# Patient Record
Sex: FEMALE | Race: WHITE
[De-identification: names, ages, dates, MRNs, and addresses within clinical notes are randomized per-mention and may not be internally consistent; named-entity substitution may affect disease eponyms.]

---

## 2018-04-27 ENCOUNTER — HOSPITAL ENCOUNTER (EMERGENCY)
Dept: HOSPITAL 14 - H.ER | Age: 58
LOS: 1 days | Discharge: HOME | End: 2018-04-28
Payer: MEDICARE

## 2018-04-27 VITALS — BODY MASS INDEX: 26.6 KG/M2

## 2018-04-27 DIAGNOSIS — I25.10: ICD-10-CM

## 2018-04-27 DIAGNOSIS — Z79.4: ICD-10-CM

## 2018-04-27 DIAGNOSIS — J44.9: ICD-10-CM

## 2018-04-27 DIAGNOSIS — I10: ICD-10-CM

## 2018-04-27 DIAGNOSIS — E11.9: ICD-10-CM

## 2018-04-27 DIAGNOSIS — M79.7: ICD-10-CM

## 2018-04-27 DIAGNOSIS — E03.9: ICD-10-CM

## 2018-04-27 DIAGNOSIS — K21.0: Primary | ICD-10-CM

## 2018-04-27 DIAGNOSIS — Z90.49: ICD-10-CM

## 2018-04-27 DIAGNOSIS — E78.00: ICD-10-CM

## 2018-04-27 LAB
ALBUMIN SERPL-MCNC: 4 G/DL (ref 3.5–5)
ALBUMIN/GLOB SERPL: 0.9 {RATIO} (ref 1–2.1)
ALT SERPL-CCNC: 52 U/L (ref 9–52)
APTT BLD: 31 SECONDS (ref 25.6–37.1)
AST SERPL-CCNC: 32 U/L (ref 14–36)
BASOPHILS # BLD AUTO: 0 K/UL (ref 0–0.2)
BASOPHILS NFR BLD: 0.3 % (ref 0–2)
BNP SERPL-MCNC: 32.7 PG/ML (ref 0–900)
BUN SERPL-MCNC: 14 MG/DL (ref 7–17)
CALCIUM SERPL-MCNC: 10 MG/DL (ref 8.4–10.2)
D DIMER: 449 NG/MLDDU (ref 0–230)
EOSINOPHIL # BLD AUTO: 0.2 K/UL (ref 0–0.7)
EOSINOPHIL NFR BLD: 2.1 % (ref 0–4)
ERYTHROCYTE [DISTWIDTH] IN BLOOD BY AUTOMATED COUNT: 15.2 % (ref 11.5–14.5)
GFR NON-AFRICAN AMERICAN: > 60
HGB BLD-MCNC: 14.4 G/DL (ref 12–16)
INR PPP: 1 (ref 0.9–1.2)
LYMPHOCYTES # BLD AUTO: 2.3 K/UL (ref 1–4.3)
LYMPHOCYTES NFR BLD AUTO: 20 % (ref 20–40)
MCH RBC QN AUTO: 28.2 PG (ref 27–31)
MCHC RBC AUTO-ENTMCNC: 33.5 G/DL (ref 33–37)
MCV RBC AUTO: 84.2 FL (ref 81–99)
MONOCYTES # BLD: 0.8 K/UL (ref 0–0.8)
MONOCYTES NFR BLD: 7.2 % (ref 0–10)
NEUTROPHILS # BLD: 8.2 K/UL (ref 1.8–7)
NEUTROPHILS NFR BLD AUTO: 70.4 % (ref 50–75)
NRBC BLD AUTO-RTO: 0.1 % (ref 0–0)
PLATELET # BLD: 343 K/UL (ref 130–400)
PMV BLD AUTO: 8.3 FL (ref 7.2–11.7)
PROTHROMBIN TIME: 11.4 SECONDS (ref 9.8–13.1)
RBC # BLD AUTO: 5.09 MIL/UL (ref 3.8–5.2)
WBC # BLD AUTO: 11.6 K/UL (ref 4.8–10.8)

## 2018-04-27 PROCEDURE — 94640 AIRWAY INHALATION TREATMENT: CPT

## 2018-04-27 PROCEDURE — 85378 FIBRIN DEGRADE SEMIQUANT: CPT

## 2018-04-27 PROCEDURE — 87040 BLOOD CULTURE FOR BACTERIA: CPT

## 2018-04-27 PROCEDURE — 84484 ASSAY OF TROPONIN QUANT: CPT

## 2018-04-27 PROCEDURE — 99285 EMERGENCY DEPT VISIT HI MDM: CPT

## 2018-04-27 PROCEDURE — 71046 X-RAY EXAM CHEST 2 VIEWS: CPT

## 2018-04-27 PROCEDURE — 87804 INFLUENZA ASSAY W/OPTIC: CPT

## 2018-04-27 PROCEDURE — 93005 ELECTROCARDIOGRAM TRACING: CPT

## 2018-04-27 PROCEDURE — 83735 ASSAY OF MAGNESIUM: CPT

## 2018-04-27 PROCEDURE — 83880 ASSAY OF NATRIURETIC PEPTIDE: CPT

## 2018-04-27 PROCEDURE — 85730 THROMBOPLASTIN TIME PARTIAL: CPT

## 2018-04-27 PROCEDURE — 71275 CT ANGIOGRAPHY CHEST: CPT

## 2018-04-27 PROCEDURE — 84100 ASSAY OF PHOSPHORUS: CPT

## 2018-04-27 PROCEDURE — 80053 COMPREHEN METABOLIC PANEL: CPT

## 2018-04-27 PROCEDURE — 85610 PROTHROMBIN TIME: CPT

## 2018-04-27 PROCEDURE — 96374 THER/PROPH/DIAG INJ IV PUSH: CPT

## 2018-04-27 PROCEDURE — 85025 COMPLETE CBC W/AUTO DIFF WBC: CPT

## 2018-04-27 NOTE — ED PDOC
HPI: Chest Pain


Time Seen by Provider: 04/27/18 21:16


Chief Complaint (Nursing): Chest Pain


Chief Complaint (Provider): Chest Pain


History Per: Patient


History/Exam Limitations: no limitations


Onset/Duration Of Symptoms: Days (x1)


Current Symptoms Are (Timing): Still Present


Additional Complaint(s): 


57 year old female presents to the emergency department with a complaint of 

constantly burning chest pain, midsternal to lower chest with bilateral spasms 

that waxes and wanes, exacerbated with eating and drinking since waking up this 

morning. She also reports a nonproductive cough and wheezing ongoing for 1 

week. Patient has some right-sided neck and head pain but notes those are 

chronic conditions. She denies any fever, chills, abdominal pain, vomiting or 

recent antibiotic use. She states that she uses a steroid inhaler daily but no 

recent oral steroids.





PMD: Derrick Mcdaniels MD





Past Medical History


Reviewed: Historical Data, Nursing Documentation, Vital Signs


Vital Signs: 


 Last Vital Signs











Temp  97.7 F   04/27/18 21:06


 


Pulse  88   04/27/18 21:40


 


Resp  16   04/27/18 21:06


 


BP  142/83   04/27/18 21:06


 


Pulse Ox  96   04/27/18 23:00














- Medical History


PMH: Arthritis, CAD, Diabetes (insulin), Fibromyalgia, HTN, Hypercholesterolemia


   Denies: HIV, Chronic Kidney Disease


   Comment Only: Hyperthyroidism (pending, was tested for), Hypothyroidism (

pending, was tested for)





- Surgical History


Surgical History: Cholecystectomy, Tonsillectomy





- Family History


Family History: States: Unknown Family Hx





- Home Medications


Home Medications: 


 Ambulatory Orders











 Medication  Instructions  Recorded


 


Bimatoprost [Lumigan] 1 drop EACHEYE HS 08/06/14


 


Enalapril Maleate [Enalapril] 10 mg PO DAILY 08/06/14


 


Liraglutide [Victoza] 1.2 mg SC DAILY 08/06/14


 


Tramadol Hydrochloride [Tramadol 50 mg PO BID PRN 08/06/14





HCl]  


 


Zolpidem Tartrate 10 mg PO HS PRN 08/06/14


 


Atorvastatin [Lipitor] 20 mg PO HS 01/21/16


 


Gabapentin [Neurontin] 300 mg PO QID 01/21/16


 


levETIRAcetam [Keppra] 500 mg PO BID 01/21/16


 


Canagliflozin [Invokana] 300 mg PO DAILY #0 tablet 01/22/16


 


Insulin Aspart/Insulin Aspar 30 units SC TID #0 ml 01/22/16





[Novolog Mix 70/30 (70/30  





units/ml) 10 ml]  


 


Insulin Glargine,Hum.rec.anlog 30 unit SC HS #0 01/22/16





[Toujeo Solostar]  


 


MetFORMIN [glucoPHAGE] 1,000 mg PO BID #0 tab 01/22/16


 


Zolpidem [Ambien] 5 mg PO HS PRN #0 tab 01/22/16


 


Omeprazole Magnesium [Prilosec Otc] 20 mg PO DAILY #30 tcp 04/28/18














- Allergies


Allergies/Adverse Reactions: 


 Allergies











Allergy/AdvReac Type Severity Reaction Status Date / Time


 


No Known Allergies Allergy   Verified 04/27/18 21:06














Review of Systems


ROS Statement: Except As Marked, All Systems Reviewed And Found Negative


Constitutional: Negative for: Fever, Chills


Cardiovascular: Positive for: Chest Pain (burning on midsternal to lower chest 

with bilateral spasm)


Respiratory: Positive for: Cough, Wheezing


Gastrointestinal: Negative for: Vomiting, Abdominal Pain


Musculoskeletal: Positive for: Neck Pain (right-sided -chronic)


Neurological: Positive for: Headache (right-sided -chronic)





Physical Exam





- Reviewed


Nursing Documentation Reviewed: Yes


Vital Signs Reviewed: Yes





- Physical Exam


Appears: Positive for: Non-toxic, In Acute Distress (mildly painful)


Head Exam: Positive for: ATRAUMATIC, NORMOCEPHALIC


Skin: Positive for: Warm, Dry


Eye Exam: Positive for: EOMI, PERRL


ENT: Positive for: Pharynx Is (clear), Other (mucus membranes moist)


Neck: Positive for: Painless ROM, Supple


Cardiovascular/Chest: Positive for: Regular Rate, Rhythm.  Negative for: Murmur


Respiratory: Positive for: Wheezing (diffuse on inspiration and expiration 

bilaterally).  Negative for: Accessory Muscle Use, Respiratory Distress


Gastrointestinal/Abdominal: Positive for: Soft.  Negative for: Tenderness


Back: Positive for: Normal Inspection.  Negative for: Muscle Spasm


Extremity: Positive for: Normal ROM.  Negative for: Deformity


Lymphatic: Negative for: Adenopathy


Neurologic/Psych: Positive for: Alert.  Negative for: Motor/Sensory Deficits





- Laboratory Results


Result Diagrams: 


 04/27/18 22:10





 04/27/18 22:10





- ECG


ECG: Positive for: Interpreted By Me


ECG Rhythm: Positive for: Normal QRS, Normal ST Segment, Sinus Rhythm


O2 Sat by Pulse Oximetry: 96 (RA)


Pulse Ox Interpretation: Normal





Medical Decision Making


Medical Decision Making: 


Initial Impression: Chest pain; Asthma exacerbation





Differential Diagnosis: Reflux; Esophagitis; Pneumonia; Bronchitis





Initial Plan:


* EKG


* BNP


* CMP


* Magnesium 


* Phosphorous


* Troponin I


* Urine dipstick


* CBC


* D dimer


* PTT


* PT


* CXR


* Duoneb 6ml INH


* Lidocaine 2% Viscous 10ml PO


* Maalox plus 30ml PO


* Pepcid 40mg PO


* Solu-medrol 125mg IVP


* Blood culture


* Influenza A B


 Ddimer elevated. Otherwise no clinically significant lab abnormalities





 EXAM:


CT Angiography Chest With Intravenous Contrast


CLINICAL HISTORY:


57 years old, female; Pain; Chest pain; Type not specified; Additional info: 

Chest pain elevated


ddimer


TECHNIQUE:


Axial computed tomographic angiography images of the chest with intravenous 

contrast using


pulmonary embolism protocol. All CT scans at this facility use one or more dose 

reduction


techniques, viz.: automated exposure control; ma/kV adjustment per patient size 

(including targeted


exams where dose is matched to indication; i.e. head); or iterative 

reconstruction technique.


3D and MIP reconstructed images were created and reviewed.


Coronal and sagittal reformatted images were created and reviewed.


CONTRAST:


95 mL of visipaque 320 administered intravenously.


COMPARISON:


CR - CHEST TWO VIEWS (PA/LAT) 2016-01-21 10:26


FINDINGS:


Pulmonary arteries: Unremarkable. No pulmonary embolism.


Aorta: No acute findings. No thoracic aortic aneurysm.


Lungs: Unremarkable. No mass. No consolidation.


Pleural space: Unremarkable. No significant effusion. No pneumothorax.


Heart: Unremarkable. No cardiomegaly. No significant pericardial effusion. No 

evidence of RV


dysfunction.


Thyroid: The right thyroid lobe is enlarged.


Bones/joints: No acute fracture. No dislocation.


Soft tissues: Unremarkable.


Lymph nodes: Unremarkable. No enlarged lymph nodes.


Gallbladder and bile ducts: There has been a cholecystectomy.


IMPRESSION:


No acute intrathoracic abnormality. No pulmonary embolism.


Cholecystectomy


Thank you for allowing us to participate in the care of your patient.


Dictated and Authenticated by: Grisel Cooper MD


04/28/2018 12:58 AM Eastern Time ([place] & [place])


 


1am Pt feels better. DAMARIS pt findings and plan of care.


Stable for dc with PMD/GI follow up.





--------------------------------------------------------------------------------

----------------------------------------


Scribe Attestation:


Documented by Sandra Dey, acting as a scribe for Blanca Petty MD.





Provider Scribe Attestation:


All medical record entries made by the Scribe were at my direction and 

personally dictated by me. I have reviewed the chart and agree that the record 

accurately reflects my personal performance of the history, physical exam, 

medical decision making, and the department course for this patient. I have 

also personally directed, reviewed, and agree with the discharge instructions 

and disposition.





Disposition





- Clinical Impression


Clinical Impression: 


 Chest pain, Reflux esophagitis





Counseled Patient/Family Regarding: Studies Performed, Diagnosis, Need For 

Followup, Rx Given, Smoking Cessation





- Disposition


Referrals: 


Derrick Mcdaniels MD [Staff Provider] -  (FOLLOW UP WITH YOUR DOCTOR IN 2-3 

DAYS TO SEE HOW YOU ARE DOING)


Disposition: Routine/Home


Disposition Time: 12:45


Condition: IMPROVED


Prescriptions: 


Omeprazole Magnesium [Prilosec Otc] 20 mg PO DAILY #30 tcp


Instructions:  Acid Reflux (Gastroesophageal Reflux Disease), Adult (DC)


Forms:  [facility] (English)

## 2018-04-28 VITALS — DIASTOLIC BLOOD PRESSURE: 81 MMHG | SYSTOLIC BLOOD PRESSURE: 142 MMHG | HEART RATE: 85 BPM | OXYGEN SATURATION: 99 %

## 2018-04-28 VITALS — TEMPERATURE: 98 F | RESPIRATION RATE: 18 BRPM

## 2018-04-28 NOTE — CARD
--------------- APPROVED REPORT --------------





EKG Measurement

Heart [id number]

[place] [id number]

[id number]

[id number]

[id number]



<Conclusion>

Normal sinus rhythm

Normal ECG

## 2018-04-28 NOTE — RAD
HISTORY:



COMPARISON:

06/17/2013.



TECHNIQUE:

Chest PA and lateral



FINDINGS:



LINES AND TUBES:

None. 



LUNG AND PLEURA:

The lungs are hyperinflated and clear. Lung markings accentuated. No 

focal consolidation.  Mild bibasilar atelectasis. 



HEART AND MEDIASTINUM:

The heart is not enlarged. The hilar and mediastinal contours are 

within normal limits.



SKELETAL STRUCTURES:

The bony structures are within normal limits for the patient's age.



VISUALIZED UPPER ABDOMEN:

Normal.



OTHER FINDINGS:

None.



IMPRESSION:

No active pulmonary disease. COPD.

## 2018-04-28 NOTE — CT
EXAM:

  CT Angiography Chest With Intravenous Contrast



CLINICAL HISTORY:

  57 years old, female; Pain; Chest pain; Type not specified; Additional info: 

Chest pain elevated ddimer



TECHNIQUE:

  Axial computed tomographic angiography images of the chest with intravenous 

contrast using pulmonary embolism protocol.  All CT scans at this facility use 

one or more dose reduction techniques, viz.: automated exposure control; ma/kV 

adjustment per patient size (including targeted exams where dose is matched to 

indication; i.e. head); or iterative reconstruction technique.

  3D and MIP reconstructed images were created and reviewed.

  Coronal and sagittal reformatted images were created and reviewed.



CONTRAST:

  95 mL of visipaque 320 administered intravenously.



COMPARISON:

  CR - CHEST TWO VIEWS (PA/LAT) 2016-01-21 10:26



FINDINGS:

  Pulmonary arteries:  Unremarkable.  No pulmonary embolism.

  Aorta:  No acute findings.  No thoracic aortic aneurysm.

  Lungs:  Unremarkable.  No mass.  No consolidation.

  Pleural space:  Unremarkable.  No significant effusion.  No pneumothorax.

  Heart:  Unremarkable.  No cardiomegaly.  No significant pericardial effusion. 

 No evidence of RV dysfunction.

  Thyroid:  The right thyroid lobe is enlarged.

  Bones/joints:  No acute fracture.  No dislocation.

  Soft tissues:  Unremarkable.

  Lymph nodes:  Unremarkable.  No enlarged lymph nodes.

  Gallbladder and bile ducts:  There has been a cholecystectomy.



IMPRESSION:     

No acute intrathoracic abnormality. No pulmonary embolism.



Cholecystectomy.

## 2018-10-14 ENCOUNTER — HOSPITAL ENCOUNTER (EMERGENCY)
Dept: HOSPITAL 14 - H.ER | Age: 58
Discharge: HOME | End: 2018-10-14
Payer: MEDICARE

## 2018-10-14 VITALS — BODY MASS INDEX: 26.6 KG/M2

## 2018-10-14 VITALS
RESPIRATION RATE: 18 BRPM | TEMPERATURE: 97.8 F | SYSTOLIC BLOOD PRESSURE: 98 MMHG | HEART RATE: 86 BPM | DIASTOLIC BLOOD PRESSURE: 65 MMHG

## 2018-10-14 VITALS — OXYGEN SATURATION: 98 %

## 2018-10-14 DIAGNOSIS — Z79.4: ICD-10-CM

## 2018-10-14 DIAGNOSIS — G40.909: Primary | ICD-10-CM

## 2018-10-14 DIAGNOSIS — E03.9: ICD-10-CM

## 2018-10-14 DIAGNOSIS — E78.00: ICD-10-CM

## 2018-10-14 DIAGNOSIS — I10: ICD-10-CM

## 2018-10-14 DIAGNOSIS — E11.9: ICD-10-CM

## 2018-10-14 LAB
ALBUMIN SERPL-MCNC: 4.1 G/DL (ref 3.5–5)
ALBUMIN/GLOB SERPL: 1 {RATIO} (ref 1–2.1)
ALT SERPL-CCNC: 68 U/L (ref 9–52)
AST SERPL-CCNC: 52 U/L (ref 14–36)
BASOPHILS # BLD AUTO: 0.1 K/UL (ref 0–0.2)
BASOPHILS NFR BLD: 0.6 % (ref 0–2)
BUN SERPL-MCNC: 36 MG/DL (ref 7–17)
CALCIUM SERPL-MCNC: 10.8 MG/DL (ref 8.4–10.2)
EOSINOPHIL # BLD AUTO: 0.2 K/UL (ref 0–0.7)
EOSINOPHIL NFR BLD: 1.9 % (ref 0–4)
ERYTHROCYTE [DISTWIDTH] IN BLOOD BY AUTOMATED COUNT: 14.9 % (ref 11.5–14.5)
GFR NON-AFRICAN AMERICAN: 46
HGB BLD-MCNC: 15.1 G/DL (ref 12–16)
LYMPHOCYTES # BLD AUTO: 2.4 K/UL (ref 1–4.3)
LYMPHOCYTES NFR BLD AUTO: 20.9 % (ref 20–40)
MCH RBC QN AUTO: 28.8 PG (ref 27–31)
MCHC RBC AUTO-ENTMCNC: 33.4 G/DL (ref 33–37)
MCV RBC AUTO: 86.3 FL (ref 81–99)
MONOCYTES # BLD: 0.7 K/UL (ref 0–0.8)
MONOCYTES NFR BLD: 5.7 % (ref 0–10)
NEUTROPHILS # BLD: 8.2 K/UL (ref 1.8–7)
NEUTROPHILS NFR BLD AUTO: 70.9 % (ref 50–75)
NRBC BLD AUTO-RTO: 0 % (ref 0–0)
PLATELET # BLD: 225 K/UL (ref 130–400)
PMV BLD AUTO: 9.2 FL (ref 7.2–11.7)
RBC # BLD AUTO: 5.26 MIL/UL (ref 3.8–5.2)
WBC # BLD AUTO: 11.5 K/UL (ref 4.8–10.8)

## 2018-10-14 PROCEDURE — 83735 ASSAY OF MAGNESIUM: CPT

## 2018-10-14 PROCEDURE — 93005 ELECTROCARDIOGRAM TRACING: CPT

## 2018-10-14 PROCEDURE — 96372 THER/PROPH/DIAG INJ SC/IM: CPT

## 2018-10-14 PROCEDURE — 85025 COMPLETE CBC W/AUTO DIFF WBC: CPT

## 2018-10-14 PROCEDURE — 99285 EMERGENCY DEPT VISIT HI MDM: CPT

## 2018-10-14 PROCEDURE — 80053 COMPREHEN METABOLIC PANEL: CPT

## 2018-10-14 PROCEDURE — 80177 DRUG SCRN QUAN LEVETIRACETAM: CPT

## 2018-10-14 PROCEDURE — 82948 REAGENT STRIP/BLOOD GLUCOSE: CPT

## 2018-10-14 NOTE — CARD
--------------- APPROVED REPORT --------------





Date of service: 10/14/2018



EKG Measurement

Heart [id number]

AK [id number]

[id number]

[id number]

[id number]



<Conclusion>

Normal sinus rhythm

Low voltage QRS

Borderline ECG

## 2018-10-14 NOTE — ED PDOC
HPI: Seizure


Time Seen by Provider: 10/14/18 12:13


Chief Complaint (Nursing): Seizure


Chief Complaint (Provider): seizure activity


History Per: Patient


History/Exam Limitations: no limitations


Additional Complaint(s): 





Annika Mcdaniels, a 58 year old female with past medical history of diabetes, 

hypertension, COPD and seizures, presents to the emergency department with 

seizure activity onset 3 days. Patient has been followed by neurologist for 

seizures and states she has been fine until 3 days ago when she noticed shaking 

in the left side of her face for a couple seconds at a time 3 times a day. She 

denies any other symptoms, LOC, blurred vision or trouble speaking. Patient has 

no allergies.








Past Medical History


Reviewed: Historical Data


Vital Signs: 





                                Last Vital Signs











Temp  97.9 F   10/14/18 12:00


 


Pulse  89   10/14/18 12:00


 


Resp  16   10/14/18 12:00


 


BP  93/65 L  10/14/18 12:00


 


Pulse Ox  98   10/14/18 12:00














- Medical History


PMH: Arthritis, CAD, COPD, Diabetes (insulin), Fibromyalgia, HTN, 

Hypercholesterolemia, Seizures


   Denies: HIV, Chronic Kidney Disease


   Comment Only: Hyperthyroidism (pending, was tested for), Hypothyroidism 

(pending, was tested for)





- Surgical History


Surgical History: Cholecystectomy, Tonsillectomy





- Family History


Family History: States: Unknown Family Hx





- Home Medications


Home Medications: 


                                Ambulatory Orders











 Medication  Instructions  Recorded


 


Bimatoprost [Lumigan] 1 drop EACHEYE HS 08/06/14


 


Enalapril Maleate [Enalapril] 10 mg PO DAILY 08/06/14


 


Liraglutide [Victoza] 1.2 mg SC DAILY 08/06/14


 


Tramadol Hydrochloride [Tramadol 50 mg PO BID PRN 08/06/14





HCl]  


 


Zolpidem Tartrate 10 mg PO HS PRN 08/06/14


 


Atorvastatin [Lipitor] 20 mg PO HS 01/21/16


 


Gabapentin [Neurontin] 300 mg PO QID 01/21/16


 


levETIRAcetam [Keppra] 500 mg PO BID 01/21/16


 


Canagliflozin [Invokana] 300 mg PO DAILY #0 tablet 01/22/16


 


Insulin Aspart/Insulin Aspar 30 units SC TID #0 ml 01/22/16





[Novolog Mix 70/30 (70/30  





units/ml) 10 ml]  


 


Insulin Glargine,Hum.rec.anlog 30 unit SC HS #0 01/22/16





[Toujeo Solostar]  


 


MetFORMIN [glucoPHAGE] 1,000 mg PO BID #0 tab 01/22/16


 


Zolpidem [Ambien] 5 mg PO HS PRN #0 tab 01/22/16


 


Omeprazole Magnesium [Prilosec Otc] 20 mg PO DAILY #30 tcp 04/28/18














- Allergies


Allergies/Adverse Reactions: 


                                    Allergies











Allergy/AdvReac Type Severity Reaction Status Date / Time


 


No Known Allergies Allergy   Verified 04/27/18 21:06














Review of Systems


ROS Statement: Except As Marked, All Systems Reviewed And Found Negative


Eyes: Negative for: Vision Change


Neurological: Positive for: Seizures.  Negative for: Change in Speech, Other 

(LOC)





Physical Exam





- Reviewed


Nursing Documentation Reviewed: Yes


Vital Signs Reviewed: Yes





- Physical Exam


Appears: Positive for: Well, Non-toxic, No Acute Distress


Head Exam: Positive for: ATRAUMATIC, NORMAL INSPECTION, NORMOCEPHALIC


Skin: Positive for: Normal Color, Warm, DRY


Eye Exam: Positive for: EOMI, Normal appearance, PERRL


ENT: Positive for: Normal ENT Inspection


Neck: Positive for: Normal, Painless ROM


Cardiovascular/Chest: Positive for: Regular Rate, Rhythm


Respiratory: Positive for: Normal Breath Sounds.  Negative for: Respiratory 

Distress


Gastrointestinal/Abdominal: Positive for: Normal Exam, Soft


Back: Positive for: Normal Inspection


Extremity: Positive for: Normal ROM


Neurologic/Psych: Positive for: Alert, Oriented.  Negative for: Other (focal 

weakness, shaking)





- Laboratory Results


Result Diagrams: 


                                 10/14/18 13:05





                                 10/14/18 13:05





- ECG


O2 Sat by Pulse Oximetry: 98 (RA)


Pulse Ox Interpretation: Normal





Medical Decision Making


Medical Decision Making: 





Time: 12:13


Initial Impression: seizures


Initial Plan:


--EKG


--CMP


--Drug screen


--Magnesium


--Urine dip


--CBC w/ differential


--Levetiracetam


--Sodium chloride 1000 IV


--Glucose, blood, POC








 

--------------------------------------------------------------------------------


-----------------


Scribe Attestation:


Documented by Lacy Alanis, acting as a scribe for Carmen Mcdowell MD.





Provider Scribe Attestation:


All medical record entries made by the Scribe were at my direction and pers

onally dictated by me. I have reviewed the chart and agree that the record 

accurately reflects my personal performance of the history, physical exam, 

medical decision making, and the department course for this patient. I have also

personally directed, reviewed, and agree with the discharge instructions and 

disposition.





2.45p - patient feels slightly better. Labs with mild electrolyte and calcium 

alterations. Doubt significant clinical impact. EKG normal as well. Patient has 

an appointment with Dr. Kowalski in 2 days. AGrees to keep appoinment.





Disposition





- Clinical Impression


Clinical Impression: 


 Seizure disorder








- Patient ED Disposition


Is Patient to be Admitted: No


Doctor Will See Patient In The: Office


Counseled Patient/Family Regarding: Diagnosis, Need For Followup





- Disposition


Referrals: 


Khoa Ross MD [Primary Care Provider] - 


Lee Kowalski MD [Staff Provider] - 


Disposition: Routine/Home


Disposition Time: 14:58


Condition: STABLE


Instructions:  Seizures, Adult (DC)


Forms:  [facility] (English)





- POA


Present On Arrival: None

## 2018-11-26 ENCOUNTER — HOSPITAL ENCOUNTER (OUTPATIENT)
Dept: HOSPITAL 14 - H.OPSURG | Age: 58
Discharge: HOME | End: 2018-11-26
Attending: PODIATRIST
Payer: MEDICARE

## 2018-11-26 VITALS
TEMPERATURE: 97.7 F | HEART RATE: 88 BPM | DIASTOLIC BLOOD PRESSURE: 59 MMHG | OXYGEN SATURATION: 97 % | SYSTOLIC BLOOD PRESSURE: 90 MMHG

## 2018-11-26 VITALS — RESPIRATION RATE: 18 BRPM

## 2018-11-26 VITALS — BODY MASS INDEX: 26.6 KG/M2

## 2018-11-26 DIAGNOSIS — M72.2: Primary | ICD-10-CM

## 2018-11-26 DIAGNOSIS — E11.9: ICD-10-CM

## 2018-11-26 DIAGNOSIS — J44.9: ICD-10-CM

## 2018-11-26 DIAGNOSIS — M77.51: ICD-10-CM

## 2018-11-26 DIAGNOSIS — Z90.710: ICD-10-CM

## 2018-11-26 DIAGNOSIS — E78.5: ICD-10-CM

## 2018-11-26 DIAGNOSIS — I10: ICD-10-CM

## 2018-11-26 DIAGNOSIS — G40.909: ICD-10-CM

## 2018-11-26 PROCEDURE — 28230 INCISION OF FOOT TENDON(S): CPT

## 2018-11-26 PROCEDURE — 82948 REAGENT STRIP/BLOOD GLUCOSE: CPT

## 2018-11-26 PROCEDURE — 0232T NJX PLATELET PLASMA: CPT

## 2018-11-26 NOTE — CP.SDSHP
Same Day Surgery [facility]





- History


Proposed Procedure: Right foot plantar fasciectomy with PRP graft


Pre-Op Diagnosis: Right foot plantar fasciitis





- Allergies


Allergies: 


Allergies





No Known Allergies Allergy (Verified 11/26/18 06:52)


   











- Physical Exam


Neuro: WNL


Heart: WNL


Lungs: WNL


GI: WNL





- Impression


Pt. Evaluated Today:Candidate for Anesthesia & Procedure: Yes





- Date & Time


Date: 11/26/18


Time: 07:07





Short Stay Discharge





- Short Stay Discharge


Admitting Diagnosis/Reason for Visit: M72.2/ M77.51/ M77.2/


Disposition: HOME/ ROUTINE


Referrals: 


Khoa Ross MD [Primary Care Provider] - 


Additional Instructions (Diet, Activity): 


-Patient in good/stable condition for discharge home


-Pt to resume medications per medical reconciliation


-Resume regular diet


-Please keep dressing clean, dry, & intact to surgical site


-Use plastic bag over bandage for showering


-Wear post op shoe at all times when ambulating


-Call clinic if you see signs of infection (redness, swelling, malodor)


-Please make an appointment to see Dr. Davis in office/clinic within 1 week 

for post-op check





Progress Note/Discharge Note with Instructions: 





- Patient evaluated bedside in recovery s/p right foot plantar fasciectomy and 

injection of platelet-rich plasma graft.


- After surgical procedure patient in NAD


- (+) Void, (+) Appetite


- Capillary refill time <3s and NVS intact.


- Patient denies complaints at this time.


- Post operative instructions and plan of care explained to patient at length.


- Patient. acknowledges verbal understanding.


- Patient stable for DC per podiatric surgery

## 2018-11-26 NOTE — CP.PCM.PN
Subjective





- Date & Time of Evaluation


Date of Evaluation: 11/26/18


Time of Evaluation: 06:56





- Subjective


Subjective: 





Podiatry progress note for Dr. Davis





58F seen and evaluated preoperatively in hospitals for right plantar fasciectomy and 

PRP grafting. Seen resting comfortably. States that she has been NPO since 7:30 

PM last night. States that she has had the same surgery in the past 5 or 6 years

ago. States the surgery was successful up until recently when the pain returned.

States that she has most pain on the inside of her right foot when she first 

gets up in the morning. States she has tried icing, stretching, and 

antiinflammatories but nothing has helped. States she has not had any reactions 

to anesthesia in the past. Denies N/V/F/C/SOB/CP today and has no other pedal 

complaints.





PMHx: IDDM, HTN, HLD


PSHx: R plantar fasciectomy, gall bladder removal, hysterectomy


All: NKDA





Objective





- Constitutional


Appears: Well, Non-toxic, No Acute Distress





- Head Exam


Head Exam: ATRAUMATIC, NORMOCEPHALIC





- Extremities Exam


Additional comments: 





Vasc: DP and PT pulses palpable; cap refill <3 seconds to all digits; temp 

gradient warm to cool from proximal to distal; mild edema noted at the level of 

the medial calc tubercle on the right foot





Derm: skin temp and turgor wnl; no open lesions or wounds present; mild 

eccyhmosis at the right medial arch proximally





Ortho: pain on palpation at the level of the medial calc tubercle; does not 

travel, localized





Neuro: gross and protective sensation intact b/l





- Neurological Exam


Neurological Exam: Alert, Awake, Oriented x3





- Psychiatric Exam


Psychiatric exam: Normal Affect, Normal Mood





Assessment and Plan





- Assessment and Plan (Free Text)


Assessment: 





58F seen and evaluated in hospitals preoperatively for right foot plantar fasciectomy 

and PRP graft placement


Plan: 





Pt was seen and examined in hospitals 


Pt NPO status was confirmed


All pre-op testing and clearance in chart


Pt has exhausted all conservative treatment at this time and is opting for 

surgical intervention


Pt was explained procedure and post-operative course


All pt's questions were answered to satisfaction


No guarantees were made


Pt understands all risks, benefits and complications of procedure


Pt will follow-up with Dr. Davis within 1 week of surgery

## 2018-11-26 NOTE — PCM.SURG1
Surgeon's Initial Post Op Note





- Surgeon's Notes


Surgeon: Dr. Davis


Assistant: Dr. Olivier PGY-2


Type of Anesthesia: IV Sedation, Local


Anesthesia Administered By: Dr. Peoples


Pre-Operative Diagnosis: right foot plantar fasciitis


Operative Findings: see dictation.  I: 1:1 mix 1% Lidocaine plain and 0.5% 

Marcaine plain; 8cc 0.5% Marcaine plain post-operatively.  M: 4-0 Nylon, Tenex, 

MTF PRP


Post-Operative Diagnosis: same


Operation Performed: right foot partial plantar fasciectomy, injection of 

platelet-rich plasma graft


Specimen/Specimens Removed: plantar fascia


Estimated Blood Loss: EBL {In ML}: 1


Blood Products Given: N/A


Drains Used: No Drains


Post-Op Condition: Good


Date of Surgery/Procedure: 11/26/18


Time of Surgery/Procedure: 08:51

## 2018-11-27 NOTE — PCM.OP
Operative Report





- Operative Report


Date of Surgery/Procedure: 11/26/18


Time of Surgery/Procedure: 07:45


Surgeon: Dr. Nathen Davis


Assistant: Dr. Lauren Olivier PGY-2


Anesthesia/Sedation: 





IV sedation with local anesthesia


Pre-Operative Diagnosis: 


right foot plantar fasciitis


Post-Operative Diagnosis: 





same


Indication for Surgery: 


The patient is a 58 year-old female with the above diagnoses.  The patient has 

exhausted all conservative treatment at this time and now requests surgical 

intervention.  The patient signed the consent after careful explanation of 

risks, benefits, complications and alternatives for surgical procedure.  No 

guarantees were given nor implied.





Operative Findings: 


Preparation:  The patient was brought in to the operating room and placed on the

operating room table in a supine position.  Timeout was performed for 

identification of the correct patient and procedure. After induction of IV 

sedation, the patient received a total of 20mL of 1:1 mixture of 2% lidocaine 

plain and 0.5% Marcaine plain in a posterior tibial and calcaneal nerve block 

fashion to the right ankle and heel.  Once local anesthesia was achieved, the 

right foot and ankle was then prepped and draped in normal sterile manner. No 

tourniquet was used during the procedure.








Procedure/Operation Description: 


Procedure #1: Right foot percutaneous tenotomy of plantar fascia under 

ultrasound guidance


Attention was then directed to the right heel. A sterile sleeve was placed over 

the ultrasound transducer and a diagnostic ultrasound was performed. Attention 

was then directed to the medial aspect of the calcaneus.  Utilizing the 

ultrasound, the anatomy was identified and the diseased, thickened area was seen

at the medial plantar fascia band near the calcaneal insertion. Using a #11 

blade, a stab incision was created over the lateral aspect of the calcaneus.  

The TX2 handpiece was introduced. Once the tip was located in the hypoechoic 

lesion, the foot pedal was depressed and the area was debrided and tissue 

excised. A total of 2 minutes and 7 seconds of ultrasonic energy was delivered. 





Procedure #2: Injection of platelet-rich plasma graft


Next, a total of 30 mL of the patient's own blood was collected.  The blood was 

placed in a centrifuge on the back table.  The centrifuge was turned on and the 

blood was spun.  Utilizing a needle and syringe, approximately 2 mL of PRP was 

collected in a sterile manner.  Utilizing a 18 gauge needle, the 2 mL of PRP was

injected to the right heel. 4-0 nylon was used to suture the skin in a simple 

suture pattern. The incision site was dressed with a bandaid. 








Estimated Blood Loss: 





1cc


Blood Replaced: 





none


Complications: 





none


Discharge & Condition: 


Postoperative Condition:  The patient tolerated the anesthesia and procedure 

well and was escorted to the recovery room with vital signs stable and 

neurovascular status intact to the right foot.  Patient may be full 

weightbearing to the right lower extremity. This patient will be seen and 

followed by Dr. Davis as an outpatient in his office.